# Patient Record
Sex: FEMALE | Race: WHITE
[De-identification: names, ages, dates, MRNs, and addresses within clinical notes are randomized per-mention and may not be internally consistent; named-entity substitution may affect disease eponyms.]

---

## 2018-05-09 ENCOUNTER — HOSPITAL ENCOUNTER (EMERGENCY)
Dept: HOSPITAL 17 - NEPK | Age: 63
Discharge: HOME | End: 2018-05-09
Payer: MEDICARE

## 2018-05-09 VITALS
SYSTOLIC BLOOD PRESSURE: 142 MMHG | HEART RATE: 90 BPM | DIASTOLIC BLOOD PRESSURE: 69 MMHG | OXYGEN SATURATION: 96 % | RESPIRATION RATE: 18 BRPM | TEMPERATURE: 98.4 F

## 2018-05-09 DIAGNOSIS — S46.912A: Primary | ICD-10-CM
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
## 2018-05-09 PROCEDURE — 99282 EMERGENCY DEPT VISIT SF MDM: CPT

## 2018-05-09 NOTE — PD
HPI


Chief Complaint:  Injury


Time Seen by Provider:  09:48


Travel History


International Travel<30 days:  No


Contact w/Intl Traveler<30days:  No


Traveled to known affect area:  No





History of Present Illness


HPI


63-year-old female presents to the emergency room for evaluation of left 

shoulder pain that started last night after she picked up multiple chairs.  

Pain is localized to the left shoulder joint and radiates down the left upper 

arm.  Stops at the elbow.  Pain is severe and constant.  Pain is worsened with 

any range of motion or if she pushes on it.  Patient took Tylenol this morning 

without any relief in symptoms.  No alleviating factors.  She denies any trauma 

to the arm previously.  No paresthesias.





PFSH


Past Medical History


Arthritis:  Yes


Cancer:  Yes (LEFT FACE)


High Cholesterol:  Yes


Neurologic:  Yes (NEUROFIBROMATOSIS)


Radiation Therapy:  Yes (JUNE 2013)


Pregnant?:  Not Pregnant





Past Surgical History


Eye Surgery:  Yes (LEFT EYE REMOVED)


Hysterectomy:  Yes





Social History


Alcohol Use:  No


Tobacco Use:  No


Substance Use:  No





Allergies-Medications


(Allergen,Severity, Reaction):  


Coded Allergies:  


     Influenza Virus Vaccines (Unverified  Allergy, Severe, NOSEBLEEDS, 5/9/18)


     MRI PRECAUTION (Verified  Allergy, Severe, 5/9/18)


 METAL PLATE IN HEAD


 11/1/14 PT STETES PLATE IS PLASTIC SO SHE CAN HAVE AN MRI


     aspirin (Unverified  Allergy, Severe, GI UPSET, 5/9/18)


     diclofenac (Unverified  Allergy, Severe, GI UPSET, 5/9/18)


     etodolac (Unverified  Allergy, Severe, GI UPSET, 5/9/18)


     flurbiprofen (Unverified  Allergy, Severe, GI UPSET, 5/9/18)


     ibuprofen (Unverified  Allergy, Severe, GI UPSET, 5/9/18)


     indomethacin (Unverified  Allergy, Severe, GI UPSET, 5/9/18)


     ketoprofen (Unverified  Allergy, Severe, GI UPSET, 5/9/18)


     ketorolac (Unverified  Allergy, Severe, GI UPSET, 5/9/18)


     naproxen (Unverified  Allergy, Severe, GI UPSET, 5/9/18)


     oxaprozin (Unverified  Allergy, Severe, GI UPSET, 5/9/18)


     cyanocobalamin (vitamin B12) (Unverified  Allergy, Intermediate, 5/9/18)


Reported Meds & Prescriptions





Reported Meds & Active Scripts


Active


Reported


Calci-Chew (Calcium Carbonate) 1,250 Mg Chew 600 Mg CHEW 


     1,250 mg calcium carbonate (500 mg elemental calcium)


Simvastatin 20 Mg Tab 20 Mg PO DAILY








Review of Systems


Except as stated in HPI:  all other systems reviewed are Neg





Physical Exam


Narrative


GENERAL: Well-nourished, well-developed female no acute distress.  Afebrile.  

Ambulatory.


SKIN: Focused skin assessment warm/dry.  No erythema or ecchymosis.


HEAD: Normocephalic.


EYES: No scleral icterus. No injection or drainage. 


NECK: Supple, trachea midline. No JVD or lymphadenopathy.


CARDIOVASCULAR: Regular rate and rhythm without murmurs, gallops, or rubs. 


RESPIRATORY: Breath sounds equal bilaterally. No accessory muscle use.


MUSCULOSKELETAL: No cyanosis.  No obvious deformity.  No edema.  Tenderness to 

palpation of the left biceps and anterior humeral head.  Somewhat limited range 

of motion secondary to pain.  2+ radial pulse in the left.  Radial, ulnar, and 

median nerves intact.





Data


Data


Last Documented VS





Vital Signs








  Date Time  Temp Pulse Resp B/P (MAP) Pulse Ox O2 Delivery O2 Flow Rate FiO2


 


5/9/18 09:39 98.4 90 18 142/69 (93) 96   








Orders





 Orders


Acetamin-Hydrocod 325-5 Mg (Norco  5-325 (5/9/18 10:00)








MDM


Medical Decision Making


Medical Screen Exam Complete:  Yes


Emergency Medical Condition:  Yes


Medical Record Reviewed:  Yes


Differential Diagnosis


Strain, sprain, contusion, fracture, dislocation


Narrative Course


63-year-old female presents to the emergency room for evaluation of left 

shoulder pain after lifting multiple chairs yesterday.  Pain is localized to 

the left anterior humeral head and radiates into the left biceps.  Worse with 

range of motion or palpation.  Physical exam reveals somewhat limited range of 

motion secondary to pain.  Left upper extremity is neurovascular intact with 2+ 

radial pulse and radial, ulnar, and median nerves intact.  I suspect muscle 

strain.  No concern for bony injury or indication for imaging.  Patient took 

Tylenol without relief in symptoms and is allergic to NSAIDs.  She will be 

given 1 Lortab in the emergency room and discharged with instructions to take 

high-dose Tylenol.  Told to follow-up with a primary care physician or return 

for worsening symptoms.  She understands and agrees to plan.





Diagnosis





 Primary Impression:  


 Left shoulder strain


 Qualified Codes:  S46.912A - Strain of unspecified muscle, fascia and tendon 

at shoulder and upper arm level, left arm, initial encounter


Referrals:  


Primary Care Physician





***Additional Instructions:  


Rest and drink plenty of fluids.


Tylenol as directed, as needed for pain.


Apply ice to the affected area for 20 minutes at a time, as needed for pain and 

swelling.


Follow-up with a primary care physician.


Return to the emergency room for worsening symptoms.


***Med/Other Pt SpecificInfo:  Prescription(s) given


Disposition:  01 DISCHARGE HOME


Condition:  Stable











Mago Barger May 9, 2018 10:08